# Patient Record
Sex: MALE | Race: WHITE | NOT HISPANIC OR LATINO | URBAN - METROPOLITAN AREA
[De-identification: names, ages, dates, MRNs, and addresses within clinical notes are randomized per-mention and may not be internally consistent; named-entity substitution may affect disease eponyms.]

---

## 2017-01-06 ENCOUNTER — EMERGENCY (EMERGENCY)
Facility: HOSPITAL | Age: 82
LOS: 1 days | Discharge: HOME | End: 2017-01-06
Attending: EMERGENCY MEDICINE | Admitting: EMERGENCY MEDICINE
Payer: MEDICARE

## 2017-01-06 VITALS
RESPIRATION RATE: 18 BRPM | TEMPERATURE: 99 F | DIASTOLIC BLOOD PRESSURE: 72 MMHG | WEIGHT: 214.95 LBS | SYSTOLIC BLOOD PRESSURE: 172 MMHG | HEART RATE: 68 BPM | HEIGHT: 75 IN | OXYGEN SATURATION: 100 %

## 2017-01-06 VITALS
RESPIRATION RATE: 16 BRPM | SYSTOLIC BLOOD PRESSURE: 181 MMHG | DIASTOLIC BLOOD PRESSURE: 80 MMHG | OXYGEN SATURATION: 98 % | TEMPERATURE: 98 F | HEART RATE: 72 BPM

## 2017-01-06 DIAGNOSIS — Z95.5 PRESENCE OF CORONARY ANGIOPLASTY IMPLANT AND GRAFT: Chronic | ICD-10-CM

## 2017-01-06 LAB
ALBUMIN SERPL ELPH-MCNC: 4 G/DL — SIGNIFICANT CHANGE UP (ref 3.3–5.2)
ALP SERPL-CCNC: 62 U/L — SIGNIFICANT CHANGE UP (ref 40–120)
ALT FLD-CCNC: 14 U/L — SIGNIFICANT CHANGE UP
ANION GAP SERPL CALC-SCNC: 10 MMOL/L — SIGNIFICANT CHANGE UP (ref 5–17)
AST SERPL-CCNC: 23 U/L — SIGNIFICANT CHANGE UP
BASOPHILS # BLD AUTO: 0 K/UL — SIGNIFICANT CHANGE UP (ref 0–0.2)
BASOPHILS NFR BLD AUTO: 0.5 % — SIGNIFICANT CHANGE UP (ref 0–2)
BILIRUB SERPL-MCNC: 0.7 MG/DL — SIGNIFICANT CHANGE UP (ref 0.4–2)
BUN SERPL-MCNC: 19 MG/DL — SIGNIFICANT CHANGE UP (ref 8–20)
CALCIUM SERPL-MCNC: 9 MG/DL — SIGNIFICANT CHANGE UP (ref 8.6–10.2)
CHLORIDE SERPL-SCNC: 102 MMOL/L — SIGNIFICANT CHANGE UP (ref 98–107)
CK MB CFR SERPL CALC: 4 NG/ML — SIGNIFICANT CHANGE UP (ref 0–6.7)
CK SERPL-CCNC: 161 U/L — SIGNIFICANT CHANGE UP (ref 30–200)
CO2 SERPL-SCNC: 28 MMOL/L — SIGNIFICANT CHANGE UP (ref 22–29)
CREAT SERPL-MCNC: 1 MG/DL — SIGNIFICANT CHANGE UP (ref 0.5–1.3)
EOSINOPHIL # BLD AUTO: 0.3 K/UL — SIGNIFICANT CHANGE UP (ref 0–0.5)
EOSINOPHIL NFR BLD AUTO: 4.1 % — SIGNIFICANT CHANGE UP (ref 0–5)
GLUCOSE SERPL-MCNC: 117 MG/DL — HIGH (ref 70–115)
HCT VFR BLD CALC: 39.9 % — LOW (ref 42–52)
HGB BLD-MCNC: 13.2 G/DL — LOW (ref 14–18)
LYMPHOCYTES # BLD AUTO: 1.5 K/UL — SIGNIFICANT CHANGE UP (ref 1–4.8)
LYMPHOCYTES # BLD AUTO: 24 % — SIGNIFICANT CHANGE UP (ref 20–55)
MCHC RBC-ENTMCNC: 31.4 PG — HIGH (ref 27–31)
MCHC RBC-ENTMCNC: 33.1 G/DL — SIGNIFICANT CHANGE UP (ref 32–36)
MCV RBC AUTO: 95 FL — HIGH (ref 80–94)
MONOCYTES # BLD AUTO: 0.6 K/UL — SIGNIFICANT CHANGE UP (ref 0–0.8)
MONOCYTES NFR BLD AUTO: 8.8 % — SIGNIFICANT CHANGE UP (ref 3–10)
NEUTROPHILS # BLD AUTO: 4 K/UL — SIGNIFICANT CHANGE UP (ref 1.8–8)
NEUTROPHILS NFR BLD AUTO: 62.4 % — SIGNIFICANT CHANGE UP (ref 37–73)
PLATELET # BLD AUTO: 201 K/UL — SIGNIFICANT CHANGE UP (ref 150–400)
POTASSIUM SERPL-MCNC: 4.4 MMOL/L — SIGNIFICANT CHANGE UP (ref 3.5–5.3)
POTASSIUM SERPL-SCNC: 4.4 MMOL/L — SIGNIFICANT CHANGE UP (ref 3.5–5.3)
PROT SERPL-MCNC: 7.1 G/DL — SIGNIFICANT CHANGE UP (ref 6.6–8.7)
RBC # BLD: 4.2 M/UL — LOW (ref 4.6–6.2)
RBC # FLD: 13.3 % — SIGNIFICANT CHANGE UP (ref 11–15.6)
SODIUM SERPL-SCNC: 140 MMOL/L — SIGNIFICANT CHANGE UP (ref 135–145)
TROPONIN T SERPL-MCNC: <0.01 NG/ML — SIGNIFICANT CHANGE UP (ref 0–0.06)
TROPONIN T SERPL-MCNC: <0.01 NG/ML — SIGNIFICANT CHANGE UP (ref 0–0.06)
WBC # BLD: 6.38 K/UL — SIGNIFICANT CHANGE UP (ref 4.8–10.8)
WBC # FLD AUTO: 6.38 K/UL — SIGNIFICANT CHANGE UP (ref 4.8–10.8)

## 2017-01-06 PROCEDURE — 70450 CT HEAD/BRAIN W/O DYE: CPT

## 2017-01-06 PROCEDURE — 80053 COMPREHEN METABOLIC PANEL: CPT

## 2017-01-06 PROCEDURE — 71045 X-RAY EXAM CHEST 1 VIEW: CPT

## 2017-01-06 PROCEDURE — 85027 COMPLETE CBC AUTOMATED: CPT

## 2017-01-06 PROCEDURE — 99285 EMERGENCY DEPT VISIT HI MDM: CPT | Mod: 25

## 2017-01-06 PROCEDURE — 82553 CREATINE MB FRACTION: CPT

## 2017-01-06 PROCEDURE — 93005 ELECTROCARDIOGRAM TRACING: CPT

## 2017-01-06 PROCEDURE — 84484 ASSAY OF TROPONIN QUANT: CPT

## 2017-01-06 PROCEDURE — 99284 EMERGENCY DEPT VISIT MOD MDM: CPT | Mod: 25

## 2017-01-06 PROCEDURE — 93010 ELECTROCARDIOGRAM REPORT: CPT

## 2017-01-06 PROCEDURE — 70450 CT HEAD/BRAIN W/O DYE: CPT | Mod: 26

## 2017-01-06 PROCEDURE — 82550 ASSAY OF CK (CPK): CPT

## 2017-01-06 PROCEDURE — 71010: CPT | Mod: 26

## 2017-01-06 RX ORDER — ASPIRIN/CALCIUM CARB/MAGNESIUM 324 MG
1 TABLET ORAL
Qty: 0 | Refills: 0 | COMMUNITY

## 2017-01-06 RX ORDER — METOPROLOL TARTRATE 50 MG
50 TABLET ORAL DAILY
Qty: 0 | Refills: 0 | Status: DISCONTINUED | OUTPATIENT
Start: 2017-01-06 | End: 2017-01-10

## 2017-01-06 RX ORDER — METOPROLOL TARTRATE 50 MG
25 TABLET ORAL ONCE
Qty: 0 | Refills: 0 | Status: DISCONTINUED | OUTPATIENT
Start: 2017-01-06 | End: 2017-01-06

## 2017-01-06 RX ORDER — METOPROLOL TARTRATE 50 MG
1 TABLET ORAL
Qty: 0 | Refills: 0 | COMMUNITY

## 2017-01-06 RX ORDER — ATORVASTATIN CALCIUM 80 MG/1
1 TABLET, FILM COATED ORAL
Qty: 0 | Refills: 0 | COMMUNITY

## 2017-01-06 RX ADMIN — Medication 50 MILLIGRAM(S): at 09:47

## 2017-01-06 NOTE — ED PROVIDER NOTE - OBJECTIVE STATEMENT
pt states he awoke to go to bathroom this am developed dizziness and neck discomfrt felt like room spinning and then developed chest pain all symptoms lasted few minutes and has now resolved  no other complaints

## 2017-01-06 NOTE — ED PROVIDER NOTE - CARE PLAN
Principal Discharge DX:	HTN (hypertension) Principal Discharge DX:	HTN (hypertension)  Secondary Diagnosis:	Vertigo  Secondary Diagnosis:	Chest pain

## 2017-01-06 NOTE — ED ADULT NURSE REASSESSMENT NOTE - NS ED NURSE REASSESS COMMENT FT1
received report from jacinto israel. patient received awake alert and oriented x3. denies chest pain or dizziness. patient nsr on cardiac monitor. will ask doctor for plan and updated patient and family accordingly.

## 2017-01-06 NOTE — ED ADULT TRIAGE NOTE - CHIEF COMPLAINT QUOTE
woke up with chest pain and dizziness since 3 am pain to lft chest tender on palpation, pain to back neck with movement , stents in 2001 , has hx of vertigo never followed up

## 2017-01-06 NOTE — ED ADULT NURSE REASSESSMENT NOTE - NS ED NURSE REASSESS COMMENT FT1
patient denies chest pain. 2nd neg trop. dr serra aware of elevated bp. okay to d/c and take bp meds at home. christina instructed to take meds when he gets home.

## 2017-01-06 NOTE — ED ADULT NURSE NOTE - CHPI ED SYMPTOMS NEG
no jaw pain/no syncope/no shortness of breath/no left arm pain/no fatigue/no chills/no cough/no fever/no chest tightness

## 2017-01-06 NOTE — CONSULT NOTE ADULT - SUBJECTIVE AND OBJECTIVE BOX
[facility], [facility]                                                    [address] , [address], [place] [address]                                                         PHONE: [phone]    FAX: [phone] [address] [facility], [address], [place] [address]                                                 PHONE: [phone]    FAX: [phone]  *******************************************************************************    Reason for Consult: Congestive Heart Failure    HPI:  ASIA JOLLY is a 86y Male    PAST MEDICAL & SURGICAL HISTORY:  High cholesterol  HTN (hypertension)  History of heart artery stent      No Known Allergies      MEDICATIONS  (STANDING):    MEDICATIONS  (PRN):      Social History: no active tobacco / EtOH / IVDA    Family History: No pertinent family history in first degree relatives      ROS: As noted above, otherwise unremarkable.    Vital Signs Last 24 Hrs  T(C): 36.7, Max: 37.2 (01-06 @ 05:18)  T(F): 98.1, Max: 99 (01-06 @ 05:18)  HR: 75 (68 - 75)  BP: 186/86 (172/72 - 186/86)  BP(mean): --  RR: 16 (16 - 18)  SpO2: 98% (98% - 100%)              PHYSICAL EXAM:  General: Appears well developed, well nourished, no acute distress  HEENT: Head: normocephalic, atraumatic  Eyes: Pupils equal and reactive  Neck: Supple, no carotid bruit, no JVD, no HJR  CARDIOVASCULAR: Normal S1 and S2, II/VI murmur, rub, or gallop  LUNGS: Clear to auscultation bilaterally, no rales, rhonchi or wheeze  ABDOMEN: Soft, nontender, non-distended, positive bowel sounds, no mass or bruit  EXTREMITIES: No edema, distal pulses WNL  SKIN: Warm and dry with normal turgor  NEURO: Alert & oriented x 3, grossly intact  PSYCH: normal mood and affect    LABS:                        13.2   6.38  )-----------( 201      ( 06 Jan 2017 05:40 )             39.9     06 Jan 2017 05:40    140    |  102    |  19.0   ----------------------------<  117    4.4     |  28.0   |  1.00     Ca    9.0        06 Jan 2017 05:40    TPro  7.1    /  Alb  4.0    /  TBili  0.7    /  DBili  x      /  AST  23     /  ALT  14     /  AlkPhos  62     06 Jan 2017 05:40    CARDIAC MARKERS ( 06 Jan 2017 05:40 )  x     / <0.01 ng/mL / 161 U/L / x     / 4.0 ng/mL            RADIOLOGY & ADDITIONAL STUDIES:    ECG:SR no acute changes    Head CT: IMPRESSION: No acute intracranial hemorrhage, mass effect, or midline   shift.    CXR: to be performed    Assessment and Plan:  In summary, ASIA JOLLY is a 86y Male with past medical history significant for awakening 3AM to go to bathroom and developing dizziness and neck pain.  Some vague CP, recreated by palpation of chest wall. No SOB.  Similar episode of dizziness 3-4mo ago that lasted for 2 days.  +HTN  -DM  Nonsmoker.  Hx of 4 stents, last 9/11/2001.  Hx of dilated aortic root and bilateral carotid disease.  Hx of HL.Has echo scheduled in our office on 1/10/17 and nuclear stress scheduled 1/12/17 routinely following office visit  10/20/16.  Sx have now resolved.  Cardiac enzymes negative x 1.  No acute ECG changes.    Echo November 17/15 EF=70-75%, mild MR,TR/AI. Diastolic dysfunction.  Aortic root and ascending aorta measuring both 3.8cm  Carotid studies November 17/15 bilateral 16-49% stenosis  Nuclear stress August 17/14 no ischemia.  EF=64%    Imp: vertigo.  Nonfocal neuro exam. No acute finding on head CT  Atypical reproducible CP in pt with known CAD and remote coronary stenting. No acute ECG changes. Sx have resolved. CE negative x 1  Cardiac murmur  HTN    - DW pt and daughter at bedside  - Check troponins x2  - CXR  - Pt currently asymptomatic.  No acute ECG changes.  Outpt ischemic evaluation and echo are pending this week  - If second set of cardiac enzymes are negative and no acute finding on CXR (being ordered by ER MD), may DC home with outpt ischemic evaluation as scheduled.  - Will maintain outpt cardiac meds, lisinopril/HCTZ 10/12.5mg daily and atorvastatin 40mg daily, ASA  - Increase metoprolol from 50mg daily to 75mg daily due to HTN  - Will also arrange for carotid studies next week.  Office called and carotid added for 1/10/17  - Anticipate DC today.    We will follow with you.  Thank you for allowing me to participate in the care of your patient.      Sincerely,    Rosie Burton MD

## 2017-01-06 NOTE — ED PROVIDER NOTE - PROGRESS NOTE DETAILS
case discussed with Dr Burton Cardiology with Dr Jonn valverde pts cardiologist and she rec repeat trop and she will eval pt in ed

## 2017-01-06 NOTE — ED ADULT NURSE NOTE - OBJECTIVE STATEMENT
Pt awake and alert x3, presents to ED c/o chest pain and dizziness. Pt states this am he woke up to go to bathroom, felt very dizzy and then chest pain started. Pt states he has also been more gassy than normal. Pt states last bowel movement was yesterday and normal. Pt denies any episodes of syncope. Pt denies any chest pressure or tightness. Pt denies any diff breathing or SOB. Pt reports history of 4 stents and HTN. Pt denies any nausea or vomiting. Pt denies any recent headaches. Pt on monitor, resting comfortably with family at bedside. Will continue to monitor. Pt awake and alert x3, presents to ED c/o chest pain and dizziness. Pt states this am he woke up to go to bathroom, felt very dizzy and then chest pain started. Pt states he has also been more gassy than normal. Pt states last bowel movement was yesterday and normal. Pt denies any episodes of syncope. Pt denies any chest pressure or tightness. Pt denies any diff breathing or SOB. Pt reports history of 4 stents and HTN. Pt denies any nausea or vomiting. Pt denies any recent headaches. Pt states he is supposed to have checkup Echo and Stress test done by his cardiologist this week. Pt on monitor, resting comfortably with family at bedside. Will continue to monitor.

## 2024-11-04 ENCOUNTER — OFFICE (OUTPATIENT)
Dept: URBAN - METROPOLITAN AREA CLINIC 103 | Facility: CLINIC | Age: 89
Setting detail: OPHTHALMOLOGY
End: 2024-11-04
Payer: COMMERCIAL

## 2024-11-04 DIAGNOSIS — H35.3221: ICD-10-CM

## 2024-11-04 DIAGNOSIS — H35.3112: ICD-10-CM

## 2024-11-04 PROCEDURE — 92134 CPTRZ OPH DX IMG PST SGM RTA: CPT | Performed by: OPHTHALMOLOGY

## 2024-11-04 PROCEDURE — 67028 INJECTION EYE DRUG: CPT | Performed by: OPHTHALMOLOGY

## 2024-11-04 PROCEDURE — 92235 FLUORESCEIN ANGRPH MLTIFRAME: CPT | Performed by: OPHTHALMOLOGY

## 2024-11-04 PROCEDURE — 92004 COMPRE OPH EXAM NEW PT 1/>: CPT | Mod: 25 | Performed by: OPHTHALMOLOGY

## 2024-11-04 ASSESSMENT — REFRACTION_CURRENTRX
OD_OVR_VA: 20/
OS_ADD: +0.25
OD_AXIS: 84
OS_OVR_VA: 20/
OS_AXIS: 94
OD_SPHERE: +2.25
OS_SPHERE: +2.00
OD_CYLINDER: -0.75
OS_CYLINDER: -0.50

## 2024-11-04 ASSESSMENT — CONFRONTATIONAL VISUAL FIELD TEST (CVF)
OS_FINDINGS: FULL
OD_FINDINGS: FULL

## 2024-11-04 ASSESSMENT — REFRACTION_AUTOREFRACTION
OD_SPHERE: +1.50
OD_CYLINDER: -3.50
OS_AXIS: 082
OS_SPHERE: +0.50
OS_CYLINDER: -2.50
OD_AXIS: 085

## 2024-11-04 ASSESSMENT — KERATOMETRY
OD_AXISANGLE_DEGREES: 177
OD_K1POWER_DIOPTERS: 40.25
OS_AXISANGLE_DEGREES: 170
OD_K2POWER_DIOPTERS: 43.00
OS_K1POWER_DIOPTERS: 41.00
OS_K2POWER_DIOPTERS: 42.50
METHOD_AUTO_MANUAL: AUTO

## 2024-11-04 ASSESSMENT — VISUAL ACUITY
OD_BCVA: 20/250
OS_BCVA: 20/30-2

## 2024-12-11 ENCOUNTER — OFFICE (OUTPATIENT)
Dept: URBAN - METROPOLITAN AREA CLINIC 103 | Facility: CLINIC | Age: 89
Setting detail: OPHTHALMOLOGY
End: 2024-12-11
Payer: COMMERCIAL

## 2024-12-11 DIAGNOSIS — H35.3112: ICD-10-CM

## 2024-12-11 DIAGNOSIS — H35.3221: ICD-10-CM

## 2024-12-11 PROCEDURE — 67028 INJECTION EYE DRUG: CPT | Mod: LT | Performed by: OPHTHALMOLOGY

## 2024-12-11 PROCEDURE — 92134 CPTRZ OPH DX IMG PST SGM RTA: CPT | Performed by: OPHTHALMOLOGY

## 2024-12-11 ASSESSMENT — VISUAL ACUITY
OD_BCVA: 20/80-2
OS_BCVA: 20/30-1

## 2024-12-11 ASSESSMENT — CONFRONTATIONAL VISUAL FIELD TEST (CVF)
OD_FINDINGS: FULL
OS_FINDINGS: FULL

## 2024-12-11 ASSESSMENT — KERATOMETRY
OD_K1POWER_DIOPTERS: 40.25
OD_AXISANGLE_DEGREES: 177
METHOD_AUTO_MANUAL: AUTO
OS_K2POWER_DIOPTERS: 42.50
OS_AXISANGLE_DEGREES: 170
OD_K2POWER_DIOPTERS: 43.00
OS_K1POWER_DIOPTERS: 41.00

## 2024-12-11 ASSESSMENT — REFRACTION_AUTOREFRACTION
OD_CYLINDER: -3.50
OS_CYLINDER: -2.50
OD_SPHERE: +1.50
OD_AXIS: 085
OS_AXIS: 082
OS_SPHERE: +0.50

## 2024-12-11 ASSESSMENT — TONOMETRY
OD_IOP_MMHG: 11
OS_IOP_MMHG: 12

## 2025-02-03 ENCOUNTER — OFFICE (OUTPATIENT)
Dept: URBAN - METROPOLITAN AREA CLINIC 103 | Facility: CLINIC | Age: OVER 89
Setting detail: OPHTHALMOLOGY
End: 2025-02-03
Payer: COMMERCIAL

## 2025-02-03 DIAGNOSIS — H35.3221: ICD-10-CM

## 2025-02-03 PROCEDURE — 92134 CPTRZ OPH DX IMG PST SGM RTA: CPT | Performed by: OPHTHALMOLOGY

## 2025-02-03 PROCEDURE — 67028 INJECTION EYE DRUG: CPT | Mod: LT | Performed by: OPHTHALMOLOGY

## 2025-02-03 ASSESSMENT — KERATOMETRY
OS_K1POWER_DIOPTERS: 41.00
METHOD_AUTO_MANUAL: AUTO
OD_K1POWER_DIOPTERS: 40.25
OD_K2POWER_DIOPTERS: 43.00
OD_AXISANGLE_DEGREES: 177
OS_AXISANGLE_DEGREES: 170
OS_K2POWER_DIOPTERS: 42.50

## 2025-02-03 ASSESSMENT — REFRACTION_AUTOREFRACTION
OS_CYLINDER: -2.50
OS_AXIS: 082
OD_SPHERE: +1.50
OS_SPHERE: +0.50
OD_AXIS: 085
OD_CYLINDER: -3.50

## 2025-02-03 ASSESSMENT — VISUAL ACUITY
OD_BCVA: 20/100
OS_BCVA: 20/40

## 2025-04-07 ENCOUNTER — OFFICE (OUTPATIENT)
Dept: URBAN - METROPOLITAN AREA CLINIC 103 | Facility: CLINIC | Age: OVER 89
Setting detail: OPHTHALMOLOGY
End: 2025-04-07
Payer: COMMERCIAL

## 2025-04-07 DIAGNOSIS — H35.3112: ICD-10-CM

## 2025-04-07 DIAGNOSIS — H35.3221: ICD-10-CM

## 2025-04-07 PROCEDURE — 92134 CPTRZ OPH DX IMG PST SGM RTA: CPT | Performed by: OPHTHALMOLOGY

## 2025-04-07 PROCEDURE — 67028 INJECTION EYE DRUG: CPT | Mod: LT | Performed by: OPHTHALMOLOGY

## 2025-04-07 ASSESSMENT — REFRACTION_AUTOREFRACTION
OS_AXIS: 082
OD_CYLINDER: -3.50
OD_AXIS: 085
OS_CYLINDER: -2.50
OS_SPHERE: +0.50
OD_SPHERE: +1.50

## 2025-04-07 ASSESSMENT — TONOMETRY
OD_IOP_MMHG: 13
OS_IOP_MMHG: 11

## 2025-04-07 ASSESSMENT — KERATOMETRY
METHOD_AUTO_MANUAL: AUTO
OD_AXISANGLE_DEGREES: 177
OD_K2POWER_DIOPTERS: 43.00
OS_K2POWER_DIOPTERS: 42.50
OD_K1POWER_DIOPTERS: 40.25
OS_K1POWER_DIOPTERS: 41.00
OS_AXISANGLE_DEGREES: 170

## 2025-04-07 ASSESSMENT — VISUAL ACUITY
OD_BCVA: 20/40-2
OS_BCVA: 20/25-2